# Patient Record
Sex: MALE | Race: WHITE | NOT HISPANIC OR LATINO | Employment: FULL TIME | URBAN - METROPOLITAN AREA
[De-identification: names, ages, dates, MRNs, and addresses within clinical notes are randomized per-mention and may not be internally consistent; named-entity substitution may affect disease eponyms.]

---

## 2024-08-15 ENCOUNTER — TELEPHONE (OUTPATIENT)
Dept: OTOLARYNGOLOGY | Facility: CLINIC | Age: 39
End: 2024-08-15
Payer: OTHER GOVERNMENT

## 2024-08-15 NOTE — TELEPHONE ENCOUNTER
Lm appointment has been cancelled on 08/21/2024 provider no longer here office to call back to reschedule later

## 2024-08-16 ENCOUNTER — TELEPHONE (OUTPATIENT)
Dept: OTOLARYNGOLOGY | Facility: CLINIC | Age: 39
End: 2024-08-16
Payer: OTHER GOVERNMENT

## 2024-08-16 NOTE — TELEPHONE ENCOUNTER
Left message for patient to call our office.    Hub to Read:    We need to inform patient that Dr. Rhoades is no longer with [facility].    We want to offer to reschedule patient with one of our other Providers. Please schedule next available New Patient appointment.

## 2024-10-23 RX ORDER — ESOMEPRAZOLE MAGNESIUM 40 MG/1
40 CAPSULE, DELAYED RELEASE ORAL DAILY
COMMUNITY

## 2024-10-24 ENCOUNTER — OFFICE VISIT (OUTPATIENT)
Dept: OTOLARYNGOLOGY | Facility: CLINIC | Age: 39
End: 2024-10-24
Payer: OTHER GOVERNMENT

## 2024-10-24 VITALS
WEIGHT: 193.2 LBS | OXYGEN SATURATION: 97 % | HEIGHT: 70 IN | TEMPERATURE: 98.4 F | BODY MASS INDEX: 27.66 KG/M2 | HEART RATE: 67 BPM

## 2024-10-24 DIAGNOSIS — R09.89 CHRONIC THROAT CLEARING: ICD-10-CM

## 2024-10-24 DIAGNOSIS — Z87.891 HISTORY OF TOBACCO ABUSE: ICD-10-CM

## 2024-10-24 DIAGNOSIS — R09.A2 GLOBUS SENSATION: Primary | ICD-10-CM

## 2024-10-24 NOTE — PROGRESS NOTES
"Patient Name: Hever Knapp Jr.   Visit Date: 10/24/2024   Patient ID: [id number]  Provider: Mert Conley MD    Sex: male  Location: [facility] [place] [facility]   YOB: 1985  Location Address: [address] [place] [address], [address] [place] [address],?[place]?[id number]    Primary Care Provider Robert Garcia MD  Location Phone: [phone]    Referring Provider: Robert Garcia MD        Chief Complaint  Foreign body sensation in throat    History of Present Illness  Hever Knapp Jr. is a 38 y.o. male who presents to [facility] today as a consult from Robert Garcia MD for evaluation of his throat.  He tells me that in May 2023 he muffled a sneeze and felt a pop in his neck.  It was painful at the time and he felt as though he could taste blood.  Since that time, he has experienced a sensation as though there is a \"knot\" in his throat.  This is more evident on swallowing.  He denies any dysphagia but does report an occasional raspy voice and throat clearing.  He has not experienced any rhinorrhea or nasal congestion.  He does report a previous history of reflux and a hiatal hernia.  He has made some lifestyle and dietary changes and has not experienced any issues with heartburn over the past few years.  [facility] which was unremarkable.        No past medical history on file.    Past Surgical History:   Procedure Laterality Date    ANKLE SURGERY      x 2    APPENDECTOMY      HERNIA REPAIR      x 2    TESTICLE SURGERY           Current Outpatient Medications:     esomeprazole (nexIUM) 40 MG capsule, Take 1 capsule by mouth Daily., Disp: , Rfl:      Allergies   Allergen Reactions    Lisinopril Unknown - Low Severity     ED issues       Social History     Tobacco Use    Smoking status: Former     Types: Cigarettes    Smokeless tobacco: Former        Objective     Vital Signs:   Pulse 67 " "  Temp 98.4 °F (36.9 °C)   Ht 177.8 cm (70\")   Wt 87.6 kg (193 lb 3.2 oz)   SpO2 97%   BMI 27.72 kg/m²       Physical Exam    General: Well developed, well nourished patient of stated age in no acute distress. Voice is strong and clear.   Head: Normocephalic and atraumatic.  Face: No lesions.  Bilateral parotid and submandibular glands are unremarkable.  Stensen's and Warthin's ducts are productive of clear saliva bilaterally.  House-Brackmann I/VI     bilaterally.   muscles and temporomandibular joint nontender to palpation.  No TMJ crepitus.  Eyes: PERRLA, sclerae anicteric, no conjunctival injection. Extraocular movements are intact and full. No nystagmus.   Ears: Auricles are normal in appearance. Bilateral external auditory canals are unremarkable. Bilateral tympanic membranes are clear and without effusion. Hearing normal to conversational voice.   Nose: External nose is normal in appearance. Bilateral nares are patent with normal appearing mucosa. Septum is significantly deviated to the left.  Right inferior turbinate is hypertrophied, left inferior turbinate appears unremarkable. No lesions.   Oral Cavity: Lips are normal in appearance. Oral mucosa is unremarkable. Gingiva is unremarkable. Normal dentition for age. Tongue is unremarkable with good movement. Hard palate is unremarkable.   Oropharynx: Soft palate is unremarkable with full movement. Uvula is unremarkable. Bilateral tonsils are unremarkable. Posterior oropharynx is unremarkable.    Larynx and hypopharynx: Deferred secondary to gag reflex.  Neck: Supple.  No mass.  Nontender to palpation.  Trachea midline. Thyroid normal size and without nodules to palpation.   Lymphatic: No lymphadenopathy upon palpation.  Respiratory: Clear to auscultation bilaterally, nonlabored respirations    Cardiovascular: RRR, no murmurs, rubs, or gallops,   Psychiatric: Appropriate affect, cooperative   Neurologic: Oriented x 3, strength symmetric in all " extremities, Cranial Nerves II-XII are grossly intact to confrontation   Skin: Warm and dry. No rashes.    Procedures     Procedure: Flexible fiberoptic nasolaryngoscopy.    Indications: Persistent globus sensation in a former smoker.    Summary: The patient's bilateral nares were decongested and anesthetized with Afrin and lidocaine sprays respectively. After giving the medications ample time to take effect flexible fiberoptic scope was inserted in the patient's right naris revealing a normal-appearing nasal cavity and nasopharynx. The base of tongue, vallecula, epiglottis, aryepiglottic folds, and arytenoid cartilages are all normal-appearing aside from some edema and erythema of the arytenoids and posterior commissure.  There is mild cobblestoning to the posterior pharynx.  The piriform sinuses were normal in appearance. The bilateral false and true vocal folds are normal in appearance and adducted and abducted normally. The subglottis was widely patent. The patient tolerated the procedure well.      Result Review :               Assessment and Plan    Diagnoses and all orders for this visit:    1. Globus sensation (Primary)    2. Chronic throat clearing    3. History of tobacco abuse      Impressions and findings were discussed at great length.  Currently, he is seen for evaluation of his throat after muffling a sneeze in 2023 which acutely caused pain.  He has since experience globus and throat clearing.  [facility] which was unremarkable.  Examination today including laryngoscopic examination today is unremarkable aside from cobblestoning of the posterior pharynx.  We discussed the differential including irritation secondary to reflux versus allergies versus muscle inflammation.  Options for further evaluation and management were discussed.  At this time, he was reassured that there is no evidence of mass or lesion.  He would like to hold off on any  medical intervention for the time being.  He was given ample time to ask questions, all of which were answered to his satisfaction.      Follow Up   No follow-ups on file.  Patient was given instructions and counseling regarding his condition or for health maintenance advice. Please see specific information pulled into the AVS if appropriate.

## 2024-10-30 ENCOUNTER — TELEPHONE (OUTPATIENT)
Dept: OTOLARYNGOLOGY | Facility: CLINIC | Age: 39
End: 2024-10-30

## 2024-10-30 NOTE — TELEPHONE ENCOUNTER
Caller: OTTO    Relationship: Provider    Best call back number: [phone]    What form or medical record are you requesting: OFFICE NOTES FROM 10/24/24    Who is requesting this form or [facility] record [facility] you: [facility]    How would you like to receive the form or medical records (pick-up, mail, fax):     FAX# [phone]    Timeframe paperwork needed: 1ST AVAIL    Additional notes: PLEASE FAX OFFICE NOTE